# Patient Record
Sex: FEMALE | Race: WHITE | NOT HISPANIC OR LATINO | URBAN - METROPOLITAN AREA
[De-identification: names, ages, dates, MRNs, and addresses within clinical notes are randomized per-mention and may not be internally consistent; named-entity substitution may affect disease eponyms.]

---

## 2017-08-10 ENCOUNTER — OFFICE VISIT - RIVER FALLS (OUTPATIENT)
Dept: FAMILY MEDICINE | Facility: CLINIC | Age: 57
End: 2017-08-10

## 2017-08-10 ASSESSMENT — MIFFLIN-ST. JEOR: SCORE: 1132.19

## 2017-08-11 LAB
CREAT SERPL-MCNC: 0.8 MG/DL (ref 0.5–1.05)
GLUCOSE BLD-MCNC: 90 MG/DL (ref 65–99)

## 2017-09-01 ENCOUNTER — AMBULATORY - RIVER FALLS (OUTPATIENT)
Dept: FAMILY MEDICINE | Facility: CLINIC | Age: 57
End: 2017-09-01

## 2017-09-01 ENCOUNTER — OFFICE VISIT - RIVER FALLS (OUTPATIENT)
Dept: FAMILY MEDICINE | Facility: CLINIC | Age: 57
End: 2017-09-01

## 2017-09-01 ASSESSMENT — MIFFLIN-ST. JEOR: SCORE: 1138.54

## 2017-09-02 LAB
CHOLEST SERPL-MCNC: 212 MG/DL
CHOLEST/HDLC SERPL: 2.6 {RATIO}
HDLC SERPL-MCNC: 82 MG/DL
LDLC SERPL CALC-MCNC: 108 MG/DL
NONHDLC SERPL-MCNC: 130 MG/DL
TRIGL SERPL-MCNC: 114 MG/DL

## 2018-03-30 ENCOUNTER — OFFICE VISIT - RIVER FALLS (OUTPATIENT)
Dept: FAMILY MEDICINE | Facility: CLINIC | Age: 58
End: 2018-03-30

## 2018-03-30 ASSESSMENT — MIFFLIN-ST. JEOR: SCORE: 1141.27

## 2022-02-11 VITALS
WEIGHT: 126.6 LBS | BODY MASS INDEX: 21.09 KG/M2 | HEART RATE: 60 BPM | TEMPERATURE: 98.3 F | WEIGHT: 128 LBS | BODY MASS INDEX: 21.33 KG/M2 | HEART RATE: 70 BPM | TEMPERATURE: 97.6 F | HEIGHT: 65 IN | HEIGHT: 65 IN | SYSTOLIC BLOOD PRESSURE: 106 MMHG | DIASTOLIC BLOOD PRESSURE: 70 MMHG | RESPIRATION RATE: 16 BRPM | SYSTOLIC BLOOD PRESSURE: 110 MMHG | DIASTOLIC BLOOD PRESSURE: 60 MMHG

## 2022-02-11 VITALS
HEART RATE: 67 BPM | BODY MASS INDEX: 21.43 KG/M2 | TEMPERATURE: 98.3 F | DIASTOLIC BLOOD PRESSURE: 78 MMHG | OXYGEN SATURATION: 98 % | HEIGHT: 65 IN | SYSTOLIC BLOOD PRESSURE: 126 MMHG | WEIGHT: 128.6 LBS

## 2022-02-16 NOTE — PROGRESS NOTES
Patient:   DIMITRY WALTERS            MRN: [id number]            FIN: [id number]               Age:   57 years     Sex:  Female     :  1960   Associated Diagnoses:   Encounter for screening for other disorder; Major depressive disorder, recurrent episode, mild degree...; Osteoporosis; Other fatigue   Author:   Melinda Valencia MD      Visit Information      Date of Service: 08/10/2017 08:58 am  Performing Location: [facility]  Encounter#: [id number]      Primary Care Provider (PCP):  PRINCE PETERSON      Referring Provider:  Melinda Valencia MD    NPI# [id number]      Chief Complaint   8/10/2017 9:03 AM CDT    pt requesting lab work, feeling fatigue      History of Present Illness             The patient presents for patient  has been living in [place] but commuting to [place], [place] for the past 4 years, she has a history of depression and says she has been having fatigue, she has been working as much as  possible while in [place] so she can come back home, so she has no life while she is there, she rents a room to have a place to sleep but no support while out of town.  her  lives here and does not want to move to [place].  the commuting was supposed to be for only about a year but now it looks like it will be another year before she is able to stop having to commute, she likes the job more in [place], so is having a hard time struggling with the idea of returning here to work vs living in [place] while her  lives here.  she was previously on paxil, then has been on citalopram, increased from 20 to 40 mg po qday and does not recall a significant improvement in mood with the dose change.  she is also taking her aderall as prescribed and says this has been a medication that has helped with her ability to work, it was diagnosed a few years ago by  a counselor in [place].  .        Review of Systems            Health Status   Allergies:    Allergic Reactions (Selected)  Severity Not Documented  No  known allergies (No reactions were documented)   Medications:  (Selected)   Documented Medications  Documented  Adderall: ( 20 mg ), po, bid, 0 Refill(s), Type: Maintenance  citalopram 40 mg oral tablet: 1 tab(s) ( 40 mg ), po, daily, 0 Refill(s), Type: Maintenance   Problem list:    All Problems (Selected)  Asymptomatic Postmenopausal Status (Age-Related) (Natural) / ICD-9-CM V49.81 / Confirmed  Attention Deficit Disorder of Childhood with Hyperactivity / ICD-9-.01 / Confirmed  Major Depressive Disorder, Recurrent Episode, Mild Degree / ICD-9-.31 / Confirmed  Osteoporosis, Unspecified / ICD-9-.00 / Confirmed  Unspecified Hemorrhoids without Mention of Complication / ICD-9-.6 / Confirmed      Histories   Past Medical History:    Resolved  Pregnancy:  Resolved.   Family History:    No family history items have been selected or recorded.   Procedure history:    No active procedure history items have been selected or recorded.   Social History:             No active social history items have been recorded.      Physical Examination   Vital Signs   8/10/2017 9:03 AM CDT Temperature Tympanic 98.3 DegF    Peripheral Pulse Rate 70 bpm    Pulse Site Radial artery    HR Method Manual    Systolic Blood Pressure 110 mmHg    Diastolic Blood Pressure 70 mmHg    Mean Arterial Pressure 83 mmHg    BP Site Right arm    BP Method Manual      Measurements from flowsheet : Measurements   8/10/2017 9:03 AM CDT Height Measured - Standard 64.5 in    Weight Measured - Standard 126.6 lb    BSA 1.61 m2    Body Mass Index 21.39 kg/m2      General:  Alert and oriented, No acute distress.    Eye:  Pupils are equal, round and reactive to light, Extraocular movements are intact, Normal conjunctiva.    HENT:  Normocephalic, hearing grossly normal during our conversation, hearing grossly normal during our conversation, hearing grossly normal during our conversation, hearing grossly normal during our conversation.     Respiratory:  Respirations are non-labored, Symmetrical chest wall expansion.    Cardiovascular:  Normal peripheral perfusion, brisk capillary refill, brisk capillary refill.    Musculoskeletal:  Normal gait.    Integumentary:  Warm, Dry, No rash.    Neurologic:  Alert, Oriented, No focal deficits.    Cognition and Speech:  Oriented, Speech clear and coherent, Functional cognition intact.    Psychiatric:  Cooperative, Normal judgment, Non-suicidal.         Mood and affect: Depressed, Flat, Tearful.         Behavior: appropriate.         Judgment: Able to make sensible decisions.         Abnormal / Psychotic thoughts: No hallucinations, No homicidal ideation, No suicidal ideation.         Thought process: Appropriate.       Health Maintenance      Recommendations     Pending (in the next year)        OverDue           Body Mass Index Check (Female) due  01/17/14  and every 1  year(s)           Cervical Cancer Screen (if sexually active) due  09/15/14  and every 3  year(s)           High Blood Pressure Screen (Female) due  10/24/14  and every 1  year(s)           Lipid Disorders Screen (Female) due  10/25/14  and every 1  year(s)        Due            Alcohol Misuse Screen (Female) due  08/10/17  and every 1  year(s)           Aspirin Therapy for Prevention of CVD (Female) due  08/10/17  and every 5  year(s)           Breast Cancer Screen due  08/10/17  and every 2  year(s)           Colorectal Cancer Screen (Colonoscopy) (Female) due  08/10/17  and every 10  year(s)           Colorectal Cancer Screen (Occult Blood) (Female) due  08/10/17  Variable frequency           Colorectal Cancer Screen (Sigmoidoscopy) (Female) due  08/10/17  Variable frequency           Depression Screen (Female) due  08/10/17  and every 1  year(s)           HIV Screen (if sexually active) (Female) due  08/10/17  and every 1  year(s)           Influenza Vaccine due  08/10/17  and every 1  year(s)           Lung Cancer Screen (Female) due   08/10/17  and every 1  year(s)           STD Counseling (if sexually active) (Female) due  08/10/17  and every 1  year(s)           Syphilis Screen (if sexually active) (Female) due  08/10/17  and every 1  year(s)           Tetanus Vaccine due  08/10/17  and every 10  year(s)           Tobacco Use Screen (Female) due  08/10/17  and every 1  year(s)     Satisfied (in the past 1 year)     There are no satisfied recommendations within the defined date range        Impression and Plan   Diagnosis     Encounter for screening for other disorder (YBH11-PM Z13.89).     Major depressive disorder, recurrent episode, mild degree... (EZJ75-YY F33.0).     Osteoporosis (UEM68-UZ M81.0).     Other fatigue (UYN78-AN R53.83).     Orders     Orders   Lab (Gen Lab  Reference Lab):  TSH* ([facility]) (Order): Specimen Type: Serum, Collection Date: 8/10/2017 9:43 AM CDT.     Orders   Lab (Gen Lab  Reference Lab):  Vitamin D, 25-Hydroxy, LC/MS/MS* ([facility]) (Order): Specimen Type: Serum, Collection Date: 8/10/2017 9:45 AM CDT.     Orders   Requests (Consults / Referrals):  Referral (Request) (Order): 8/10/2017 9:51 AM CDT, Referred to: Psychology, Major depressive disorder, recurrent episode, mild degree....     Orders   Pharmacy:  DULoxetine 20 mg oral delayed release capsule (Prescribe): 1 cap(s) ( 20 mg ), po, bid, # 60 cap(s), 1 Refill(s), Type: Maintenance, Pharmacy: [id number], 1 cap(s) po bid.     Orders   Pharmacy:  citalopram 40 mg oral tablet (Prescribe): 0.5 tab(s) ( 20 mg ), po, daily, # 15 tab(s), 1 Refill(s), Type: Maintenance, patient has supply at home (Rx)  citalopram 40 mg oral tablet (Discontinue).     Orders     Orders   Lab (Gen Lab  Reference Lab):  Hepatitis C Antibody* ([facility]) (Order): Specimen Type: Serum, Collection Date: 8/10/2017 10:07 AM CDT.     Orders   Lab (Gen Lab  Reference Lab):  Basic Metabolic Panel* ([facility]) (Order): Specimen Type: Serum, Collection Date: 8/10/2017 10:08 AM CDT  CBC  (includes diff/plt)* ([facility]) (Order): Specimen Type: Blood, Collection Date: 8/10/2017 10:08 AM CDT.     Plan:  25 minutes spent with patient in direct face to face contact >50% spent counseling, we will decrease citalopram to 20 mg po qday and start duloxetine 20 mg po qday, pt will let me know if mood worsens and will rtc in 2 weeks for follow up.

## 2022-02-16 NOTE — PROGRESS NOTES
Chief Complaint    Pt here for FU on Depression.  History of Present Illness      Patient is here today for follow-up on her mood.  Since our last visit she has been taking duloxetine 20 mg p.o. daily.  She stopped taking her citalopram altogether.  She has only been taking 1 of the duloxetine p.o. daily.  She reports that she feels a little bit foggy however she seems to be a little bit less sad.  He is not sure if it is because the medication is working her because she feels like she is going to get some relief being treated for her depression and feels hopeful for this.  She reports that 1 of her biggest frustrations is feeling like she does not have any space for herself that she is renting some space out of someone else's house as a place to sleep.  This is been going on for 4-1/2 years.  She is not sure when she might be able to start working back in [place].  She has had no worsening of symptoms, no suicidal ideation no diarrhea no headaches.       Review of systems as per HPI otherwise negative exam general alert oriented no acute distress psych patient seems more relaxed today she is not tearful her affect is still a little blunted but she seems less sad.  She seems to have better insight.  Having no hallucinations and no suicidal or homicidal ideation       Assessment and plan depression we will have patient increase the duloxetine to 2 of the 20 mg capsules daily for 3 days and then switched to 60 mg 1 p.o. daily of the duloxetine.  She will be back in town in 4 weeks and can follow-up with me then however she meets me in the meantime she will let me know.  Please note 15 minutes was spent with patient in direct face-to-face contact of which greater than 50% of the time was spent counseling.  Physical Exam   Vitals & Measurements    T: 97.6(Tympanic)  HR: 60(Peripheral)  RR: 16  BP: 106/60     HT: 64.5 in  WT: 128 lb  BMI: 21.63   Assessment/Plan       Major depressive disorder, recurrent episode,  mild degree...         Ordered:          DULoxetine, 1 cap(s) ( 60 mg ), po, daily, # 30 cap(s), 1 Refill(s), Type: Maintenance, Pharmacy: [id number], 1 cap(s) po daily,x30 day(s)          DULoxetine, See Instructions, Instructions: 2 cap(s) po daily 3 day(s), # 6 EA, 0 Refill(s), Type: Maintenance, patient has supply at home (Rx)          [id number] office outpatient visit 15 minutes (Charge), Quantity: 1, Major depressive disorder, recurrent episode, mild degree...          Return to Clinic (Request), Return in 4 weeks                Orders:         Lipid panel with reflex to direct ldl* ([facility]), Specimen Type: Serum, Collection Date: 09/01/17 10:22:00 CDT         TSH* ([facility]), Specimen Type: Serum, Collection Date: 09/01/17 10:22:00 CDT  Problem List/Past Medical History    Ongoing     Asymptomatic Postmenopausal Status (Age-Related) (Natural)     Attention Deficit Disorder of Childhood with Hyperactivity     Major Depressive Disorder, Recurrent Episode, Mild Degree     Osteoporosis, Unspecified     Unspecified Hemorrhoids without Mention of Complication    Historical     History of chicken pox     Pregnancy  Procedure/Surgical History     Extraction of wisdom tooth  Medications    Adderall, 20 mg, po, bid    DULoxetine 20 mg oral delayed release capsule, See Instructions    DULoxetine 60 mg oral delayed release capsule, 60 mg= 1 cap(s), po, daily, 1 refills  Allergies    No known allergies  Social History    Smoking Status - 08/10/2017     Never smoker     Tobacco - 09/01/2017      Never (less than 100 in lifetime)  Immunizations      Vaccine Date Status      Td 12/12/2007 Recorded  Lab Results      Results (Last 90 days)                Laboratory                     Chemistry                          General Chemistry                               BUN:      23 mg/dL  (08/10/17 10:32 AM CDT)                                                                                                                                           BUN/Creat Ratio:      NOT APPLICABLE   (08/10/17 10:32 AM CDT)                                                                                                                                          CO2 Level:      28 mmol/L  (08/10/17 10:32 AM CDT)                                                                                                                                          Calcium Level:      9.2 mg/dL  (08/10/17 10:32 AM CDT)                                                                                                                                          Chloride Level:      103 mmol/L  (08/10/17 10:32 AM CDT)                                                                                                                                          Creatinine Level:      0.80 mg/dL  (08/10/17 10:32 AM CDT)                                                                                                                                          Glucose Level:      90 mg/dL  (08/10/17 10:32 AM CDT)                                                                                                                                          Potassium Level:      4.3 mmol/L  (08/10/17 10:32 AM CDT)                                                                                                                                          Sodium Level:      139 mmol/L  (08/10/17 10:32 AM CDT)                                                                                                                                          eGFR:      82 mL/min/1.73m2  (08/10/17 10:32 AM CDT)                                                                                                                                          eGFR African American:      95 mL/min/1.73m2  (08/10/17 10:32 AM CDT)                                                                                                                                      Thyroid Studies                               T3 Total                                        (08/10/17 10:32 AM CDT)                                                                                                                                                L 68 ng/dL (Range 76 - 181)  (08/10/17 10:32 AM CDT)                                                                                                                                          T4 Free                                        (08/10/17 10:32 AM CDT)                                                                                                                                                0.8 ng/dL  (08/10/17 10:32 AM CDT)                                                                                                                                          TSH                                        (08/10/17 10:32 AM CDT)                                                                                                                                                L 0.38 mIU/L (Range 0.40 - 4.50)  (08/10/17 10:32 AM CDT)                                                                                                                                     Vitamins                               Vitamin D 25 OH:      31 ng/mL  (08/10/17 10:32 AM CDT)                                                                                                                                          Vitamin D, 1, 25 Dihydroxy:         (08/10/17 10:32 AM CDT)                                                                                                                                Hematology                          CBC                               Hct:      40.8 %  (08/10/17 10:32 AM CDT)                                                                                                                                          Hgb:      13.9 gm/dL   (08/10/17 10:32 AM CDT)                                                                                                                                          MCH:      29.7 pg  (08/10/17 10:32 AM CDT)                                                                                                                                          MCHC:      34.1 gm/dL  (08/10/17 10:32 AM CDT)                                                                                                                                          MCV:      87.2 fL  (08/10/17 10:32 AM CDT)                                                                                                                                          MPV:      9.9 fL  (08/10/17 10:32 AM CDT)                                                                                                                                          Platelet:      283   (08/10/17 10:32 AM CDT)                                                                                                                                          RBC:      4.68   (08/10/17 10:32 AM CDT)                                                                                                                                          RDW:      12.5 %  (08/10/17 10:32 AM CDT)                                                                                                                                          WBC                                        (08/10/17 10:32 AM CDT)                                                                                                                                                4.8   (08/10/17 10:32 AM CDT)                                                                                                                                     Differential                               Abs Basophils:      72   (08/10/17 10:32 AM CDT)                                                                                                                                           Abs Eosinophils:      250   (08/10/17 10:32 AM CDT)                                                                                                                                          Abs Lymphocytes:      1661   (08/10/17 10:32 AM CDT)                                                                                                                                          Abs Monocytes:      394   (08/10/17 10:32 AM CDT)                                                                                                                                          Abs Neutrophils:      2424   (08/10/17 10:32 AM CDT)                                                                                                                                          Basophils:      1.5 %  (08/10/17 10:32 AM CDT)                                                                                                                                          Eosinophils:      5.2 %  (08/10/17 10:32 AM CDT)                                                                                                                                          Lymphocytes:      34.6 %  (08/10/17 10:32 AM CDT)                                                                                                                                          Monocytes:      8.2 %  (08/10/17 10:32 AM CDT)                                                                                                                                          Neutrophils:      50.5 %  (08/10/17 10:32 AM CDT)                                                                                                                                Immunology/Serology                          Hepatitis Testing                               Hep C Ab:      NON-REACTIVE   (08/10/17 10:32 AM CDT)                                                                                                                                           Hep C Signal to Cutoff:      0.89   (08/10/17 10:32 AM CDT)                                                                                                                                Miscellaneous Lab                          Misc Test CPT Code                               Misc Test CPT Code:      859SB 866SB   (08/10/17 10:32 AM CDT)                                                                                                                                     Misc Test Client Contact                               Misc Test Client Contact:      CYNDY EVANS   (08/10/17 10:32 AM CDT)                                                                                                                                     Misc Test Comment                               Misc Test Comment                                     See comment   (08/10/17 10:32 AM CDT)                                                                                                                                                See comment   (08/10/17 10:32 AM CDT)                                                                                                                                     Test in Question - Test(s) Ordered                               Test in Question - Test(s) Ordered:      T3, TOTAL T4, FREE   (08/10/17 10:32 AM CDT)

## 2022-02-16 NOTE — PROGRESS NOTES
Chief Complaint    Patient presents for eye irritation-R eye, red, dry, itchy x 1 week getting worse  History of Present Illness      Chief complaint as above reviewed and confirmed with patient.  Pt presents to the clinic with concerns re: face rash and eye irritation.  She states that she had a rash that looked like pimple type of lesion on the lateral aspect of the R eye. she picked them and then over the next few days developed several mores lesions on the same side of the face as well as the L side.  Then developed eye irritation as well.  no fevers.  the lesions on the skin were some what oozy but she has used teatree oil and it dried it all up.  she has senistive skin so has generally used topicals without fregrances and dyes and good for sensitive skin. no new or different topicals recently.  no pain or itching.  Review of Systems      Review of systems is negative with the exception of those noted in HPI          Physical Exam   Vitals & Measurements    T: 98.3(Tympanic)  HR: 67(Peripheral)  BP: 126/78  SpO2: 98%     HT: 64.5 in  WT: 128.6 lb  BMI: 21.73       exam of the face reveals about 10 total lesion, 5 on each side, scattered about the cheeks, temples and chin, of erythematous papules and shallow ulcerations ranging in size from about 3-4 mm to 1 cm in size  no current crusitng or oozing. lateral to the R eye there is dryness and flaking present  conjunctiva on the R is injected more laterally, no discharge.  floursacine dye placed and no increased uptake at the cornea.  PERRL      EOMI      no body rash present.  Assessment/Plan       Conjunctivitis         polytrim as orered.  PI given and discussed.         Ordered:          64336 office outpatient visit 15 minutes (Charge), Quantity: 1, Rash  Conjunctivitis                Rash         suspect this impetigo and spread after picking, may be cause of conjunctivitis as well wiht strep/staph infection. keflex and bactroban as ordered.  PI given and  disucssed. Pt instructed to return to clinic for persistent or worsening symptoms.                    Ordered:          41430 office outpatient visit 15 minutes (Charge), Quantity: 1, Rash  Conjunctivitis                Orders:         cephalexin, 1 cap(s) ( 500 mg ), PO, QID, # 40 cap(s), 0 Refill(s), Type: Maintenance, Pharmacy: [facility] [id number], 1 cap(s) po qid,x10 day(s)         mupirocin topical, 1 lamont, TOP, TID, # 15 g, 0 Refill(s), Type: Maintenance, Pharmacy: [facility] [id number], 1 lamont top tid         polymyxin B-trimethoprim ophthalmic, 1 drop(s), right eye, q3hr, # 10 mL, 0 Refill(s), Type: Maintenance, Pharmacy: [facility] [id number], 1 drop(s) right eye q3 hr (int),x10 day(s)  Patient Information     Name:DIMITRY WALTERS      Address:      [address] [address]     Sex:Female     YOB: 1960     [phone]     MRN:[id number]     FIN:[id number]     Location:[facility]     Date of Service:03/30/2018      Primary Care Physician:       NONE ,   Problem List/Past Medical History    Ongoing     Asymptomatic Postmenopausal Status (Age-Related) (Natural)     Attention deficit disorder of childhood with hyperactivity     Major depressive disorder, recurrent episode, mild degree     Osteoporosis     Unspecified hemorrhoids    Historical     History of chicken pox     Pregnancy  Procedure/Surgical History     Extraction of wisdom tooth  Medications     Adderall: 20 mg, po, bid, 0 Refill(s).     citalopram 20 mg oral tablet: 40 mg, 2 tab(s), po, daily, 0 Refill(s).     Polytrim 10,000 units-1 mg/mL ophthalmic solution: 1 drop(s), right eye, q3hr, for 10 day(s), 10 mL, 0 Refill(s).     Keflex 500 mg oral capsule: 500 mg, 1 cap(s), PO, QID, for 10 day(s), 40 cap(s), 0 Refill(s).     Bactroban 2% topical ointment: 1 lamont, TOP, TID, 15 g, 0 Refill(s).          Allergies    No known allergies  Social History    Smoking Status - 03/30/2018      Never smoker     Alcohol - 09/05/2017      Past     Tobacco - 09/05/2017      Never (less than 100 in lifetime)  Immunizations      Vaccine Date Status      influenza virus vaccine, inactivated 10/02/2009 Recorded      influenza virus vaccine, inactivated 12/12/2007 Recorded      Td 12/12/2007 Recorded  Lab Results   Results (Last 90 days)   No results located.